# Patient Record
Sex: FEMALE | Race: WHITE | NOT HISPANIC OR LATINO | Employment: OTHER | URBAN - METROPOLITAN AREA
[De-identification: names, ages, dates, MRNs, and addresses within clinical notes are randomized per-mention and may not be internally consistent; named-entity substitution may affect disease eponyms.]

---

## 2017-03-14 ENCOUNTER — CLINICAL SUPPORT NO REQUIREMENTS (OUTPATIENT)
Dept: CARDIOLOGY | Facility: CLINIC | Age: 82
End: 2017-03-14

## 2017-03-14 DIAGNOSIS — I49.5 SINOATRIAL NODE DYSFUNCTION (HCC): Primary | ICD-10-CM

## 2017-03-14 PROCEDURE — 93296 REM INTERROG EVL PM/IDS: CPT | Performed by: INTERNAL MEDICINE

## 2017-03-14 PROCEDURE — 93294 REM INTERROG EVL PM/LDLS PM: CPT | Performed by: INTERNAL MEDICINE

## 2017-03-22 ENCOUNTER — OFFICE VISIT (OUTPATIENT)
Dept: FAMILY MEDICINE CLINIC | Facility: CLINIC | Age: 82
End: 2017-03-22

## 2017-03-22 VITALS
HEART RATE: 122 BPM | DIASTOLIC BLOOD PRESSURE: 78 MMHG | TEMPERATURE: 97.3 F | OXYGEN SATURATION: 83 % | BODY MASS INDEX: 27.65 KG/M2 | SYSTOLIC BLOOD PRESSURE: 118 MMHG | HEIGHT: 67 IN | WEIGHT: 176.2 LBS

## 2017-03-22 DIAGNOSIS — M48.061 LUMBAR CANAL STENOSIS: ICD-10-CM

## 2017-03-22 DIAGNOSIS — J43.1 PANLOBULAR EMPHYSEMA (HCC): Primary | ICD-10-CM

## 2017-03-22 PROCEDURE — 99213 OFFICE O/P EST LOW 20 MIN: CPT | Performed by: FAMILY MEDICINE

## 2017-03-22 RX ORDER — HYDROCODONE BITARTRATE AND ACETAMINOPHEN 5; 325 MG/1; MG/1
1 TABLET ORAL 2 TIMES DAILY PRN
Qty: 45 TABLET | Refills: 0 | Status: SHIPPED | OUTPATIENT
Start: 2017-03-22 | End: 2017-03-22 | Stop reason: SDUPTHER

## 2017-03-22 RX ORDER — PREDNISONE 10 MG/1
10 TABLET ORAL 2 TIMES DAILY
Qty: 14 TABLET | Refills: 0 | Status: SHIPPED | OUTPATIENT
Start: 2017-03-22 | End: 2018-03-05

## 2017-03-22 RX ORDER — ALBUTEROL SULFATE 2.5 MG/3ML
2.5 SOLUTION RESPIRATORY (INHALATION) EVERY 4 HOURS PRN
Qty: 30 VIAL | Refills: 5 | Status: SHIPPED | OUTPATIENT
Start: 2017-03-22 | End: 2019-01-01

## 2017-03-22 RX ORDER — ALBUTEROL SULFATE 90 UG/1
2 AEROSOL, METERED RESPIRATORY (INHALATION) EVERY 4 HOURS PRN
Qty: 1 INHALER | Refills: 11 | Status: SHIPPED | OUTPATIENT
Start: 2017-03-22

## 2017-03-22 RX ORDER — HYDROCODONE BITARTRATE AND ACETAMINOPHEN 5; 325 MG/1; MG/1
1 TABLET ORAL 2 TIMES DAILY PRN
Qty: 45 TABLET | Refills: 0 | Status: SHIPPED | OUTPATIENT
Start: 2017-03-22 | End: 2017-05-17 | Stop reason: SDUPTHER

## 2017-03-22 RX ORDER — AMOXICILLIN 500 MG/1
500 TABLET, FILM COATED ORAL 3 TIMES DAILY
Qty: 21 TABLET | Refills: 0 | Status: SHIPPED | OUTPATIENT
Start: 2017-03-22 | End: 2017-03-29

## 2017-03-22 NOTE — PROGRESS NOTES
"  Subjective   Salena Sanford is a 85 y.o. female who is here for   Chief Complaint   Patient presents with   • Cough   • Shortness of Breath   • Med Refill   • COPD   .     History of Present Illness   COPD: Patient complains of dyspnea, cough, wheezing, fatigue, chills, fever and increased sputum. Symptoms began several months ago. Symptoms chronic dyspnea, dyspnea after 1 blocks, dyspnea after 1 flights of stairs and inability to climb stairs does worsen with exertion. Sputum is green in copious amounts. Fever has been hot and cold spells. Patient uses 2 pillows at night. Patient can walk 10 feet before resting. Patient currently is not on home oxygen therapy.. Respiratory history: COPD, emphysema and pneumonia    Still smoking heavy  We again discussed she is smoking her self to death.  Explained a pneumonia may kill her.  \"I'm 86 yo, I'm ready to go.\"  Has changed her mind about home oxygen, wants to be tested.    The following portions of the patient's history were reviewed and updated as appropriate: allergies, current medications, past family history, past medical history, past social history, past surgical history and problem list.    Review of Systems    Objective   Physical Exam   Constitutional: She has a sickly appearance.   Cardiovascular: Normal rate.    Pulmonary/Chest: She has wheezes. She has rales.   Neurological: She is alert.   Psychiatric: Cognition and memory are impaired.   Nursing note and vitals reviewed.      Assessment/Plan   Salena was seen today for cough, shortness of breath, med refill and copd.    Diagnoses and all orders for this visit:    Panlobular emphysema  -     albuterol (PROAIR HFA) 108 (90 BASE) MCG/ACT inhaler; Inhale 2 puffs Every 4 (Four) Hours As Needed for Wheezing. Indications: Chronic Obstructive Lung Disease  -     albuterol (PROVENTIL) (2.5 MG/3ML) 0.083% nebulizer solution; Take 2.5 mg by nebulization Every 4 (Four) Hours As Needed for Wheezing for up to 30 doses. " Indications: Acute Bronchospasm  -     Overnight Sleep Oximetry Study; Future  -     amoxicillin (AMOXIL) 500 MG tablet; Take 1 tablet by mouth 3 (Three) Times a Day for 7 days.  -     predniSONE (DELTASONE) 10 MG tablet; Take 1 tablet by mouth 2 (Two) Times a Day.    Lumbar canal stenosis  -     Discontinue: HYDROcodone-acetaminophen (NORCO) 5-325 MG per tablet; Take 1 tablet by mouth 2 (Two) Times a Day As Needed for Severe Pain (7-10).  -     HYDROcodone-acetaminophen (NORCO) 5-325 MG per tablet; Take 1 tablet by mouth 2 (Two) Times a Day As Needed for Severe Pain (7-10).  -     Overnight Sleep Oximetry Study; Future      There are no Patient Instructions on file for this visit.    Medications Discontinued During This Encounter   Medication Reason   • predniSONE (DELTASONE) 10 MG tablet    • albuterol (PROAIR HFA) 108 (90 BASE) MCG/ACT inhaler Reorder   • albuterol (PROVENTIL) (2.5 MG/3ML) 0.083% nebulizer solution Reorder   • HYDROcodone-acetaminophen (NORCO) 5-325 MG per tablet Reorder   • HYDROcodone-acetaminophen (NORCO) 5-325 MG per tablet Reorder        No Follow-up on file.    Dr. Adarsh Khan  [place], [place]

## 2017-03-24 RX ORDER — VALSARTAN AND HYDROCHLOROTHIAZIDE 160; 12.5 MG/1; MG/1
1 TABLET, FILM COATED ORAL DAILY
Qty: 90 TABLET | Refills: 2 | Status: SHIPPED | OUTPATIENT
Start: 2017-03-24 | End: 2017-03-27 | Stop reason: SDUPTHER

## 2017-03-27 RX ORDER — VALSARTAN AND HYDROCHLOROTHIAZIDE 160; 12.5 MG/1; MG/1
1 TABLET, FILM COATED ORAL DAILY
Qty: 90 TABLET | Refills: 2 | Status: SHIPPED | OUTPATIENT
Start: 2017-03-27 | End: 2018-03-05 | Stop reason: SDUPTHER

## 2017-04-19 ENCOUNTER — OFFICE VISIT (OUTPATIENT)
Dept: FAMILY MEDICINE CLINIC | Facility: CLINIC | Age: 82
End: 2017-04-19

## 2017-04-19 VITALS
OXYGEN SATURATION: 90 % | HEART RATE: 54 BPM | BODY MASS INDEX: 27.29 KG/M2 | DIASTOLIC BLOOD PRESSURE: 80 MMHG | SYSTOLIC BLOOD PRESSURE: 160 MMHG | TEMPERATURE: 97.2 F | HEIGHT: 67 IN | WEIGHT: 173.9 LBS

## 2017-04-19 DIAGNOSIS — J43.1 PANLOBULAR EMPHYSEMA (HCC): Primary | ICD-10-CM

## 2017-04-19 DIAGNOSIS — Z99.81 OXYGEN DEPENDENT: ICD-10-CM

## 2017-04-19 PROBLEM — Z66 DNR (DO NOT RESUSCITATE): Status: ACTIVE | Noted: 2017-04-19

## 2017-04-19 PROCEDURE — 99213 OFFICE O/P EST LOW 20 MIN: CPT | Performed by: FAMILY MEDICINE

## 2017-04-19 RX ORDER — AMOXICILLIN 500 MG/1
500 TABLET, FILM COATED ORAL 3 TIMES DAILY
Qty: 21 TABLET | Refills: 0 | Status: SHIPPED | OUTPATIENT
Start: 2017-04-19 | End: 2017-04-26

## 2017-04-19 RX ORDER — IPRATROPIUM BROMIDE AND ALBUTEROL SULFATE 2.5; .5 MG/3ML; MG/3ML
3 SOLUTION RESPIRATORY (INHALATION) EVERY 4 HOURS PRN
Qty: 120 VIAL | Refills: 11 | Status: SHIPPED | OUTPATIENT
Start: 2017-04-19 | End: 2018-03-05 | Stop reason: SDUPTHER

## 2017-04-19 NOTE — PROGRESS NOTES
Subjective   Salena Sanford is a 85 y.o. female who is here for   Chief Complaint   Patient presents with   • COPD   • Follow-up   .     History of Present Illness   COPD: Patient complains of dyspnea, cough, wheezing, fatigue, increased sputum and colored sputum. Symptoms began several years ago. Symptoms chronic dyspnea, dyspnea after 1 blocks, dyspnea after 1 flights of stairs and inability to climb stairs does worsen with exertion. Sputum is yellow in copious amounts. Fever has been hot and cold spells. Patient uses 4 pillows at night. Patient can walk 10 feet before resting. Patient currently is on oxygen at 2 L/min per nasal cannula.. Respiratory history: emphysema     Had + overnight pulse ox  Spent most of night < 90 %  Goes tomorrow for her 6 minute walk.        The following portions of the patient's history were reviewed and updated as appropriate: allergies, current medications, past medical history, past social history and problem list.    Review of Systems    Objective   Physical Exam   Constitutional: She appears well-developed and well-nourished. No distress.   HENT:   Mouth/Throat: Oropharynx is clear and moist.   Eyes: Conjunctivae are normal.   Cardiovascular: Normal rate.    Pulmonary/Chest: She has wheezes. She has rales.   Neurological: She is alert.   Psychiatric: She exhibits a depressed mood.   Nursing note and vitals reviewed.      Assessment/Plan   Salena was seen today for copd and follow-up.    Diagnoses and all orders for this visit:    Panlobular emphysema  -     ipratropium-albuterol (DUO-NEB) 0.5-2.5 mg/mL nebulizer; Take 3 mL by nebulization Every 4 (Four) Hours As Needed for Wheezing. Indications: Spasm of Lung Air Passages  -     amoxicillin (AMOXIL) 500 MG tablet; Take 1 tablet by mouth 3 (Three) Times a Day for 7 days.    Oxygen dependent  -     ipratropium-albuterol (DUO-NEB) 0.5-2.5 mg/mL nebulizer; Take 3 mL by nebulization Every 4 (Four) Hours As Needed for Wheezing.  Indications: Spasm of Lung Air Passages      There are no Patient Instructions on file for this visit.    There are no discontinued medications.     Return in about 1 month (around 5/19/2017).    Dr. Adarsh Khan  [place], [place]

## 2017-05-17 ENCOUNTER — OFFICE VISIT (OUTPATIENT)
Dept: FAMILY MEDICINE CLINIC | Facility: CLINIC | Age: 82
End: 2017-05-17

## 2017-05-17 VITALS
DIASTOLIC BLOOD PRESSURE: 70 MMHG | HEIGHT: 67 IN | WEIGHT: 173 LBS | OXYGEN SATURATION: 92 % | HEART RATE: 81 BPM | SYSTOLIC BLOOD PRESSURE: 120 MMHG | BODY MASS INDEX: 27.15 KG/M2 | RESPIRATION RATE: 18 BRPM

## 2017-05-17 DIAGNOSIS — J43.1 PANLOBULAR EMPHYSEMA (HCC): Primary | ICD-10-CM

## 2017-05-17 DIAGNOSIS — M48.061 LUMBAR CANAL STENOSIS: ICD-10-CM

## 2017-05-17 DIAGNOSIS — I10 ESSENTIAL HYPERTENSION: ICD-10-CM

## 2017-05-17 PROCEDURE — 99213 OFFICE O/P EST LOW 20 MIN: CPT | Performed by: FAMILY MEDICINE

## 2017-05-17 RX ORDER — AMOXICILLIN 500 MG/1
500 TABLET, FILM COATED ORAL 3 TIMES DAILY
Qty: 21 TABLET | Refills: 0 | Status: SHIPPED | OUTPATIENT
Start: 2017-05-17 | End: 2017-05-24

## 2017-05-17 RX ORDER — HYDROCODONE BITARTRATE AND ACETAMINOPHEN 5; 325 MG/1; MG/1
1 TABLET ORAL 2 TIMES DAILY PRN
Qty: 45 TABLET | Refills: 0 | Status: SHIPPED | OUTPATIENT
Start: 2017-05-17 | End: 2017-08-22 | Stop reason: SDUPTHER

## 2017-05-17 RX ORDER — HYDROCODONE BITARTRATE AND ACETAMINOPHEN 5; 325 MG/1; MG/1
1 TABLET ORAL 2 TIMES DAILY PRN
Qty: 45 TABLET | Refills: 0 | Status: SHIPPED | OUTPATIENT
Start: 2017-05-17 | End: 2017-05-17 | Stop reason: SDUPTHER

## 2017-08-22 ENCOUNTER — OFFICE VISIT (OUTPATIENT)
Dept: FAMILY MEDICINE CLINIC | Facility: CLINIC | Age: 82
End: 2017-08-22

## 2017-08-22 VITALS
TEMPERATURE: 97.6 F | HEART RATE: 64 BPM | OXYGEN SATURATION: 97 % | DIASTOLIC BLOOD PRESSURE: 68 MMHG | BODY MASS INDEX: 27.7 KG/M2 | SYSTOLIC BLOOD PRESSURE: 120 MMHG | HEIGHT: 67 IN | WEIGHT: 176.5 LBS

## 2017-08-22 DIAGNOSIS — M48.061 LUMBAR CANAL STENOSIS: ICD-10-CM

## 2017-08-22 DIAGNOSIS — J43.1 PANLOBULAR EMPHYSEMA (HCC): Primary | ICD-10-CM

## 2017-08-22 DIAGNOSIS — I10 ESSENTIAL HYPERTENSION: ICD-10-CM

## 2017-08-22 PROCEDURE — 99213 OFFICE O/P EST LOW 20 MIN: CPT | Performed by: FAMILY MEDICINE

## 2017-08-22 RX ORDER — HYDROCODONE BITARTRATE AND ACETAMINOPHEN 5; 325 MG/1; MG/1
1 TABLET ORAL 2 TIMES DAILY PRN
Qty: 45 TABLET | Refills: 0 | Status: SHIPPED | OUTPATIENT
Start: 2017-08-22 | End: 2018-03-05 | Stop reason: SDUPTHER

## 2017-08-22 RX ORDER — CELECOXIB 200 MG/1
200 CAPSULE ORAL DAILY
Qty: 90 CAPSULE | Refills: 3 | Status: SHIPPED | OUTPATIENT
Start: 2017-08-22 | End: 2018-08-22 | Stop reason: SDUPTHER

## 2017-08-22 NOTE — PROGRESS NOTES
"  Chief Complaint   Patient presents with   • Follow-up   • Hypertension   • Emphysema       Subjective     Patient here for follow-up of elevated blood pressure.    She is not exercising and is not adherent to a low-salt diet.    Blood pressure is well controlled at home.   Cardiac symptoms: dyspnea, fatigue, lower extremity edema and tachypnea.   Patient denies: chest pressure/discomfort and near-syncope.   Cardiovascular risk factors: advanced age (older than 55 for men, 65 for women), dyslipidemia, family history of premature cardiovascular disease, hypertension and smoking/ tobacco exposure.   Use of agents associated with hypertension: none.   History of target organ damage: none.  Patient is taking prescribed hypertension medications as prescribed without side effects.  Back pain is the same  Overall OA was getting worse, but she stopped her Celebrex, her family has convinced her to re start Celebrex.  On her oxygen 24h  Her feet and toes are as blue as ever.  Still smoking a ppd, with no intentions of every quitting    The following portions of the patient's history were reviewed and updated as appropriate: allergies, current medications, past medical history, past social history, past surgical history and problem list.    Review of Systems  A comprehensive review of systems was negative except for: Respiratory: positive for chronic bronchitis, cough, dyspnea on exertion, emphysema, sputum and wheezing    No results found for this or any previous visit.     Vitals:    08/22/17 1249   BP: 120/68   BP Location: Left arm   Patient Position: Sitting   Pulse: 64   Temp: 97.6 °F (36.4 °C)   SpO2: 97%   Weight: 176 lb 8 oz (80.1 kg)   Height: 67\" (170.2 cm)     Objective    Gen: alert, pleasant.  HEENT: PERRL, EOMI intact, lids ok, ear canals clear, TMs normal, throat clear, nostrils normal  Neck: no bruit, no enlarged thyroid  Lungs: wheeze  Heart: RR no murmur  ABD: soft , + BS  Pulses:feet blue  Mood: stable   "   Assessment/Plan   Hypertension, normal blood pressure Evidence of target organ damage: none.    Salena was seen today for follow-up, hypertension and emphysema.    Diagnoses and all orders for this visit:    Panlobular emphysema    Essential hypertension    Lumbar canal stenosis  -     celecoxib (CeleBREX) 200 MG capsule; Take 1 capsule by mouth Daily.  -     HYDROcodone-acetaminophen (NORCO) 5-325 MG per tablet; Take 1 tablet by mouth 2 (Two) Times a Day As Needed for Severe Pain .      Medication: no change.  Follow up: 6 months and as needed.    There are no Patient Instructions on file for this visit.  Medications Discontinued During This Encounter   Medication Reason   • celecoxib (CeleBREX) 200 MG capsule Reorder   • HYDROcodone-acetaminophen (NORCO) 5-325 MG per tablet Reorder        No Follow-up on file.    Limit salt  Limit alcoholic drinks to 1 a day  Limit caffeine to 1-2 servings a day    Dr. Adarsh Khan MD  [place], [place]  [facility].

## 2017-08-31 ENCOUNTER — TELEPHONE (OUTPATIENT)
Dept: FAMILY MEDICINE CLINIC | Facility: CLINIC | Age: 82
End: 2017-08-31

## 2017-09-15 ENCOUNTER — TELEPHONE (OUTPATIENT)
Dept: FAMILY MEDICINE CLINIC | Facility: CLINIC | Age: 82
End: 2017-09-15

## 2017-09-15 RX ORDER — CEFDINIR 300 MG/1
300 CAPSULE ORAL 2 TIMES DAILY
Qty: 20 CAPSULE | Refills: 0 | Status: SHIPPED | OUTPATIENT
Start: 2017-09-15 | End: 2018-03-05

## 2017-09-15 NOTE — TELEPHONE ENCOUNTER
Pts daughter called and said that pt is coughing up think green stuff and is SOA. She is thinking she really needs an antibiotic. She would like us to send her in one to pharmacy.

## 2017-10-21 ENCOUNTER — CLINICAL SUPPORT NO REQUIREMENTS (OUTPATIENT)
Dept: CARDIOLOGY | Facility: CLINIC | Age: 82
End: 2017-10-21

## 2017-10-21 DIAGNOSIS — I49.5 SICK SINUS SYNDROME (HCC): Primary | ICD-10-CM

## 2017-10-21 PROCEDURE — 93296 REM INTERROG EVL PM/IDS: CPT | Performed by: INTERNAL MEDICINE

## 2017-10-21 PROCEDURE — 93294 REM INTERROG EVL PM/LDLS PM: CPT | Performed by: INTERNAL MEDICINE

## 2018-03-05 ENCOUNTER — OFFICE VISIT (OUTPATIENT)
Dept: FAMILY MEDICINE CLINIC | Facility: CLINIC | Age: 83
End: 2018-03-05

## 2018-03-05 VITALS
SYSTOLIC BLOOD PRESSURE: 138 MMHG | BODY MASS INDEX: 28.17 KG/M2 | DIASTOLIC BLOOD PRESSURE: 70 MMHG | WEIGHT: 179.5 LBS | HEIGHT: 67 IN | TEMPERATURE: 97.4 F | OXYGEN SATURATION: 92 % | HEART RATE: 76 BPM

## 2018-03-05 DIAGNOSIS — I10 ESSENTIAL HYPERTENSION: ICD-10-CM

## 2018-03-05 DIAGNOSIS — Z99.81 OXYGEN DEPENDENT: ICD-10-CM

## 2018-03-05 DIAGNOSIS — J44.1 CHRONIC OBSTRUCTIVE PULMONARY DISEASE WITH ACUTE EXACERBATION (HCC): Primary | ICD-10-CM

## 2018-03-05 DIAGNOSIS — J43.1 PANLOBULAR EMPHYSEMA (HCC): ICD-10-CM

## 2018-03-05 DIAGNOSIS — M48.061 SPINAL STENOSIS OF LUMBAR REGION WITHOUT NEUROGENIC CLAUDICATION: ICD-10-CM

## 2018-03-05 PROCEDURE — 99213 OFFICE O/P EST LOW 20 MIN: CPT | Performed by: FAMILY MEDICINE

## 2018-03-05 RX ORDER — HYDROCODONE BITARTRATE AND ACETAMINOPHEN 5; 325 MG/1; MG/1
1 TABLET ORAL
Qty: 30 TABLET | Refills: 0 | Status: SHIPPED | OUTPATIENT
Start: 2018-03-05 | End: 2019-01-01

## 2018-03-05 RX ORDER — VALSARTAN AND HYDROCHLOROTHIAZIDE 160; 12.5 MG/1; MG/1
1 TABLET, FILM COATED ORAL DAILY
Qty: 90 TABLET | Refills: 3 | Status: SHIPPED | OUTPATIENT
Start: 2018-03-05 | End: 2019-01-01 | Stop reason: SDUPTHER

## 2018-03-05 RX ORDER — HYDROCODONE BITARTRATE AND ACETAMINOPHEN 5; 325 MG/1; MG/1
1 TABLET ORAL
Qty: 30 TABLET | Refills: 0 | Status: SHIPPED | OUTPATIENT
Start: 2018-03-05 | End: 2018-03-05 | Stop reason: SDUPTHER

## 2018-03-05 RX ORDER — GUAIFENESIN 600 MG/1
1200 TABLET, EXTENDED RELEASE ORAL 2 TIMES DAILY
COMMUNITY

## 2018-03-05 RX ORDER — IPRATROPIUM BROMIDE AND ALBUTEROL SULFATE 2.5; .5 MG/3ML; MG/3ML
3 SOLUTION RESPIRATORY (INHALATION) EVERY 4 HOURS PRN
Qty: 120 VIAL | Refills: 5 | Status: SHIPPED | OUTPATIENT
Start: 2018-03-05 | End: 2019-01-01 | Stop reason: SDUPTHER

## 2018-03-05 NOTE — PROGRESS NOTES
Subjective   Salena Sanford is a 86 y.o. female who is here for   Chief Complaint   Patient presents with   • Follow-up   • Hypertension   • Emphysema   .     History of Present Illness   COPD: Patient complains of dyspnea, cough, wheezing and fatigue. Symptoms began 10 years ago. Symptoms chronic dyspnea, dyspnea after 1 blocks, dyspnea after 1 flights of stairs, inability to climb stairs, cough productive of green sputum in copious amounts and wheezing does worsen with exertion. Sputum is green in copious amounts. Fever has been hot and cold spells. Patient uses 4 pillows at night. Patient can walk 20 feet before resting. Patient currently is on oxygen at 2 L/min per nasal cannula.. Respiratory history: frequent episodes of bronchitis, COPD, emphysema and pneumonia     Still smoking, no plans on quitting    Wants no tests/labs/preventive care/ nothing.  Strong willed  Ok with death.     The following portions of the patient's history were reviewed and updated as appropriate: allergies, current medications, past medical history, past social history, past surgical history and problem list.    Review of Systems    Objective   Physical Exam   Constitutional: She appears well-developed and well-nourished.   Cardiovascular: Normal rate.    Pulmonary/Chest: She has wheezes. She has rales.   Neurological: She is alert.   Nursing note and vitals reviewed.      Assessment/Plan   Salena was seen today for follow-up, hypertension and emphysema.    Diagnoses and all orders for this visit:    Chronic obstructive pulmonary disease with acute exacerbation    Panlobular emphysema  -     ipratropium-albuterol (DUO-NEB) 0.5-2.5 mg/mL nebulizer; Take 3 mL by nebulization Every 4 (Four) Hours As Needed for Wheezing. Indications: Spasm of Lung Air Passages    Oxygen dependent  -     ipratropium-albuterol (DUO-NEB) 0.5-2.5 mg/mL nebulizer; Take 3 mL by nebulization Every 4 (Four) Hours As Needed for Wheezing. Indications: Spasm of  Lung Air Passages    Spinal stenosis of lumbar region without neurogenic claudication  -     Discontinue: HYDROcodone-acetaminophen (NORCO) 5-325 MG per tablet; Take 1 tablet by mouth Daily With Breakfast.  -     HYDROcodone-acetaminophen (NORCO) 5-325 MG per tablet; Take 1 tablet by mouth Daily With Breakfast.    Essential hypertension  -     valsartan-hydrochlorothiazide (DIOVAN-HCT) 160-12.5 MG per tablet; Take 1 tablet by mouth Daily. Indications: High Blood Pressure Disorder      There are no Patient Instructions on file for this visit.    Medications Discontinued During This Encounter   Medication Reason   • cefdinir (OMNICEF) 300 MG capsule *Therapy completed   • predniSONE (DELTASONE) 10 MG tablet *Therapy completed   • valsartan-hydrochlorothiazide (DIOVAN-HCT) 160-12.5 MG per tablet Reorder   • ipratropium-albuterol (DUO-NEB) 0.5-2.5 mg/mL nebulizer Reorder   • HYDROcodone-acetaminophen (NORCO) 5-325 MG per tablet Reorder   • HYDROcodone-acetaminophen (NORCO) 5-325 MG per tablet Reorder        Return in about 6 months (around 9/5/2018).    Dr. Adarsh Khan  [facility]  [place], [place]

## 2018-03-05 NOTE — PROGRESS NOTES
"  Chief Complaint   Patient presents with   • Follow-up   • Hypertension   • Emphysema       Subjective     Patient here for follow-up of elevated blood pressure.    She {is/is not:9024} exercising and {is/is not:9024} adherent to a low-salt diet.    Blood pressure {is/is not:9024} well controlled at home.   Cardiac symptoms: {symptoms:14337}.   Patient denies: {symptoms; cardiac:63315}.   Cardiovascular risk factors: {risk factors:510}.   Use of agents associated with hypertension: {meds:511::\"none\"}.   History of target organ damage: {diagnoses:1135860088}.  Patient is taking prescribed hypertension medications as prescribed without side effects.    {Common ambulatory SmartLinks:34311}    Review of Systems  {ros; complete:13575}    No results found for this or any previous visit.     Vitals:    03/05/18 1239   BP: 138/70   BP Location: Left arm   Patient Position: Sitting   Pulse: 76   Temp: 97.4 °F (36.3 °C)   SpO2: 92%   Weight: 81.4 kg (179 lb 8 oz)   Height: 170.2 cm (67\")     Objective    Gen: alert, pleasant.  HEENT: PERRL, EOMI intact, lids ok, ear canals clear, TMs normal, throat clear, nostrils normal  Neck: no bruit, no enlarged thyroid  Lungs: CTA  Heart: RR no murmur  ABD: soft , + BS  Pulses: intact  Mood: stable     Assessment/Plan   Hypertension, {control:42706} Evidence of target organ damage: {target organ:5287569034}.    {Assess/PlanSmartLinks:07590}  {Plan:5827110699}    There are no Patient Instructions on file for this visit.  Medications Discontinued During This Encounter   Medication Reason   • cefdinir (OMNICEF) 300 MG capsule *Therapy completed   • predniSONE (DELTASONE) 10 MG tablet *Therapy completed        No Follow-up on file.    Limit salt  Limit alcoholic drinks to 1 a day  Limit caffeine to 1-2 servings a day    Dr. Adarsh Khan MD  [place], [place]  [facility].  "

## 2018-08-22 DIAGNOSIS — M48.061 LUMBAR CANAL STENOSIS: ICD-10-CM

## 2018-08-22 RX ORDER — CELECOXIB 200 MG/1
CAPSULE ORAL
Qty: 90 CAPSULE | Refills: 0 | Status: SHIPPED | OUTPATIENT
Start: 2018-08-22 | End: 2019-01-05 | Stop reason: SDUPTHER

## 2019-01-01 ENCOUNTER — TELEPHONE (OUTPATIENT)
Dept: FAMILY MEDICINE CLINIC | Facility: CLINIC | Age: 84
End: 2019-01-01

## 2019-01-01 ENCOUNTER — OFFICE VISIT (OUTPATIENT)
Dept: FAMILY MEDICINE CLINIC | Facility: CLINIC | Age: 84
End: 2019-01-01

## 2019-01-01 ENCOUNTER — TELEPHONE (OUTPATIENT)
Dept: CASE MANAGEMENT | Facility: OTHER | Age: 84
End: 2019-01-01

## 2019-01-01 VITALS
WEIGHT: 186 LBS | SYSTOLIC BLOOD PRESSURE: 128 MMHG | BODY MASS INDEX: 29.19 KG/M2 | HEART RATE: 62 BPM | DIASTOLIC BLOOD PRESSURE: 88 MMHG | HEIGHT: 67 IN | OXYGEN SATURATION: 95 %

## 2019-01-01 VITALS
HEIGHT: 67 IN | DIASTOLIC BLOOD PRESSURE: 78 MMHG | HEART RATE: 73 BPM | WEIGHT: 182 LBS | SYSTOLIC BLOOD PRESSURE: 134 MMHG | OXYGEN SATURATION: 86 % | BODY MASS INDEX: 28.56 KG/M2

## 2019-01-01 DIAGNOSIS — M48.061 SPINAL STENOSIS OF LUMBAR REGION WITHOUT NEUROGENIC CLAUDICATION: ICD-10-CM

## 2019-01-01 DIAGNOSIS — J43.1 PANLOBULAR EMPHYSEMA (HCC): Primary | ICD-10-CM

## 2019-01-01 DIAGNOSIS — M48.061 LUMBAR CANAL STENOSIS: ICD-10-CM

## 2019-01-01 DIAGNOSIS — I10 ESSENTIAL HYPERTENSION: ICD-10-CM

## 2019-01-01 DIAGNOSIS — Z99.81 OXYGEN DEPENDENT: ICD-10-CM

## 2019-01-01 DIAGNOSIS — Z95.0 HISTORY OF PERMANENT CARDIAC PACEMAKER PLACEMENT: Primary | ICD-10-CM

## 2019-01-01 DIAGNOSIS — J43.1 PANLOBULAR EMPHYSEMA (HCC): ICD-10-CM

## 2019-01-01 DIAGNOSIS — Z66 DNR (DO NOT RESUSCITATE): ICD-10-CM

## 2019-01-01 DIAGNOSIS — J44.1 CHRONIC OBSTRUCTIVE PULMONARY DISEASE WITH ACUTE EXACERBATION (HCC): Primary | ICD-10-CM

## 2019-01-01 PROCEDURE — 99213 OFFICE O/P EST LOW 20 MIN: CPT | Performed by: FAMILY MEDICINE

## 2019-01-01 RX ORDER — VALSARTAN AND HYDROCHLOROTHIAZIDE 160; 12.5 MG/1; MG/1
TABLET, FILM COATED ORAL
Qty: 90 TABLET | Refills: 2 | Status: SHIPPED | OUTPATIENT
Start: 2019-01-01

## 2019-01-01 RX ORDER — NAPROXEN SODIUM 220 MG
220 TABLET ORAL 2 TIMES DAILY PRN
COMMUNITY

## 2019-01-01 RX ORDER — CELECOXIB 200 MG/1
200 CAPSULE ORAL DAILY
Qty: 90 CAPSULE | Refills: 3 | Status: SHIPPED | OUTPATIENT
Start: 2019-01-01

## 2019-01-01 RX ORDER — CELECOXIB 200 MG/1
CAPSULE ORAL
Qty: 30 CAPSULE | Refills: 0 | Status: SHIPPED | OUTPATIENT
Start: 2019-01-01 | End: 2019-01-01 | Stop reason: SDUPTHER

## 2019-01-01 RX ORDER — NITROGLYCERIN 0.4 MG/1
0.4 TABLET SUBLINGUAL
Qty: 30 TABLET | Refills: 0 | Status: SHIPPED | OUTPATIENT
Start: 2019-01-01

## 2019-01-01 RX ORDER — HYDROCODONE BITARTRATE AND ACETAMINOPHEN 5; 325 MG/1; MG/1
1 TABLET ORAL
Qty: 30 TABLET | Refills: 0 | Status: SHIPPED | OUTPATIENT
Start: 2019-01-01 | End: 2019-01-01 | Stop reason: SDUPTHER

## 2019-01-01 RX ORDER — CEPHALEXIN 500 MG/1
500 CAPSULE ORAL 3 TIMES DAILY
Qty: 21 CAPSULE | Refills: 0 | Status: SHIPPED | OUTPATIENT
Start: 2019-01-01 | End: 2019-01-01

## 2019-01-01 RX ORDER — HYDROCODONE BITARTRATE AND ACETAMINOPHEN 5; 325 MG/1; MG/1
1 TABLET ORAL
Qty: 30 TABLET | Refills: 0 | Status: SHIPPED | OUTPATIENT
Start: 2019-01-01

## 2019-01-01 RX ORDER — IPRATROPIUM BROMIDE AND ALBUTEROL SULFATE 2.5; .5 MG/3ML; MG/3ML
3 SOLUTION RESPIRATORY (INHALATION) EVERY 4 HOURS PRN
Qty: 120 VIAL | Refills: 5 | Status: SHIPPED | OUTPATIENT
Start: 2019-01-01

## 2019-01-05 DIAGNOSIS — M48.061 LUMBAR CANAL STENOSIS: ICD-10-CM

## 2019-01-07 RX ORDER — CELECOXIB 200 MG/1
CAPSULE ORAL
Qty: 30 CAPSULE | Refills: 0 | Status: SHIPPED | OUTPATIENT
Start: 2019-01-07 | End: 2019-01-01 | Stop reason: SDUPTHER

## 2019-01-22 NOTE — PROGRESS NOTES
"  Subjective   Salena Sanford is a 87 y.o. female who is here for   Chief Complaint   Patient presents with   • Follow-up   • Hypertension   • COPD   .     History of Present Illness   COPD: Patient complains of dyspnea, cough, wheezing and fatigue. Symptoms began 10 years ago. Symptoms chronic dyspnea and cough productive of yellow sputum in large amounts does worsen with exertion. Sputum is green in large amounts. Fever has been hot and cold spells. Patient uses 4 pillows at night. Patient can walk 5 feet before resting. Patient currently is on oxygen at 2 L/min per nasal cannula.. Respiratory history: COPD   I smoke during the day and wear my oxygen at night\"      The following portions of the patient's history were reviewed and updated as appropriate: allergies, current medications, past medical history, past social history, past surgical history and problem list.    Review of Systems    Objective   Physical Exam   Constitutional: She has a sickly appearance. No distress.   Cardiovascular: Normal rate.   Pulmonary/Chest: She has wheezes. She has rales.   Musculoskeletal: She exhibits tenderness and deformity.   Nursing note and vitals reviewed.      Assessment/Plan   Salena was seen today for follow-up, hypertension and copd.    Diagnoses and all orders for this visit:    Spinal stenosis of lumbar region without neurogenic claudication  -     HYDROcodone-acetaminophen (NORCO) 5-325 MG per tablet; Take 1 tablet by mouth Daily With Breakfast.    Lumbar canal stenosis  -     celecoxib (CeleBREX) 200 MG capsule; Take 1 capsule by mouth Daily.      There are no Patient Instructions on file for this visit.    Medications Discontinued During This Encounter   Medication Reason   • HYDROcodone-acetaminophen (NORCO) 5-325 MG per tablet *Therapy completed   • albuterol (PROVENTIL) (2.5 MG/3ML) 0.083% nebulizer solution *Therapy completed   • celecoxib (CeleBREX) 200 MG capsule Reorder        Return in about 6 months " (around 7/22/2019).    Dr. Adarsh Khan  [facility] [place], [place]

## 2019-01-22 NOTE — PROGRESS NOTES
"  Chief Complaint   Patient presents with   • Follow-up   • Hypertension   • COPD       Subjective     Patient here for follow-up of elevated blood pressure.    She {is/is not:9024} exercising and {is/is not:9024} adherent to a low-salt diet.    Blood pressure {is/is not:9024} well controlled at home.   Cardiac symptoms: {symptoms:66850}.   Patient denies: {symptoms; cardiac:77205}.   Cardiovascular risk factors: {risk factors:510}.   Use of agents associated with hypertension: {meds:511::\"none\"}.   History of target organ damage: {diagnoses:9651370972}.  Patient is taking prescribed hypertension medications as prescribed without side effects.    {Common ambulatory SmartLinks:14361}    Review of Systems  {ros; complete:45754}    No results found for this or any previous visit.     Vitals:    01/22/19 1438   BP: 134/78   BP Location: Left arm   Patient Position: Sitting   Pulse: 73   SpO2: (!) 86%   Weight: 82.6 kg (182 lb)   Height: 170.2 cm (67\")     BP Readings from Last 3 Encounters:   01/22/19 134/78   03/05/18 138/70   08/22/17 120/68     Objective      Gen: alert, pleasant.  HEENT: PERRL, EOMI intact, lids ok, ear canals clear, TMs normal, throat clear, nostrils normal  Neck: no bruit, no enlarged thyroid  Lungs: CTA  Heart: RR no murmur  ABD: soft , + BS  Pulses: intact  Mood: stable     Assessment/Plan   Hypertension, {control:41073} Evidence of target organ damage: {target organ:2201337050}.    {Assess/PlanSmartLinks:25820}  {Plan:8257538797}    There are no Patient Instructions on file for this visit.  Medications Discontinued During This Encounter   Medication Reason   • HYDROcodone-acetaminophen (NORCO) 5-325 MG per tablet *Therapy completed   • albuterol (PROVENTIL) (2.5 MG/3ML) 0.083% nebulizer solution *Therapy completed   • celecoxib (CeleBREX) 200 MG capsule Reorder        No Follow-up on file.    Limit salt  Limit alcoholic drinks to 1 a day  Limit caffeine to 1-2 servings a day    Dr. Altamirano " Erin AIKEN  [place], [place]  [facility].

## 2019-04-24 NOTE — TELEPHONE ENCOUNTER
Asking for a refill on her mothers norco    Ok I printed 2 and gave to her daughter Amanda who is in the office today    Adarsh Khan MD

## 2019-07-31 NOTE — PROGRESS NOTES
Subjective   Salena Sanford is a 87 y.o. female who is here for   Chief Complaint   Patient presents with   • COPD   .     History of Present Illness   COPD: Patient complains of dyspnea, cough, wheezing and fatigue. Symptoms began 30 years ago. Symptoms chronic dyspnea, dyspnea after 1 blocks, dyspnea after 1 flights of stairs, inability to climb stairs and cough productive of green sputum in moderate amounts does worsen with exertion. Sputum is green in moderate amounts. Fever has been hot and cold spells. Patient uses 4 pillows at night. Patient can walk 20 feet before resting. Patient currently is on oxygen at 2 L/min per nasal cannula.. Respiratory history: COPD and emphysema        The following portions of the patient's history were reviewed and updated as appropriate: allergies, current medications, past medical history, past social history, past surgical history and problem list.    Review of Systems    Objective   Physical Exam   Constitutional: She has a sickly appearance.   Cardiovascular: Normal rate.   Pulmonary/Chest: She has wheezes. She has rales.   Musculoskeletal: She exhibits edema.   Neurological: She is alert.   Nursing note and vitals reviewed.  feet swollen and dark red and toes are blue, as usual    Assessment/Plan   Salena was seen today for copd.    Diagnoses and all orders for this visit:    Panlobular emphysema (CMS/HCC)  -     ipratropium-albuterol (DUO-NEB) 0.5-2.5 mg/3 ml nebulizer; Take 3 mL by nebulization Every 4 (Four) Hours As Needed for Wheezing.    Essential hypertension    Oxygen dependent  -     ipratropium-albuterol (DUO-NEB) 0.5-2.5 mg/3 ml nebulizer; Take 3 mL by nebulization Every 4 (Four) Hours As Needed for Wheezing.    Spinal stenosis of lumbar region without neurogenic claudication  -     HYDROcodone-acetaminophen (NORCO) 5-325 MG per tablet; Take 1 tablet by mouth Daily With Breakfast.    outlook is poor    There are no Patient Instructions on file for this  visit.    Medications Discontinued During This Encounter   Medication Reason   • ipratropium-albuterol (DUO-NEB) 0.5-2.5 mg/mL nebulizer Reorder   • HYDROcodone-acetaminophen (NORCO) 5-325 MG per tablet Reorder        Return in about 6 months (around 1/31/2020).    Dr. Adarsh Khan  [place], [place]

## 2019-08-12 NOTE — TELEPHONE ENCOUNTER
Patients daughter called, patient felt like her heart stopped, then pace maker kicked back on. She does not want to go anywhere or see anyone. Pt was given aspirin and oxygen, states she feels better. Daughter would like to know if you can send in a new refill of Nitro. Her's has been  since . Would like to to go to [address].     Jose  SL nitro sent in    Adarsh Khan MD

## 2019-09-27 NOTE — TELEPHONE ENCOUNTER
Pt daughter called stating everyone around her has URI symptoms and now pt does too. She has been using mucinex but its not helping, can something be called in?    kelfex antibiotics sent in to pharmacy    Adarsh Khan MD

## 2019-10-29 NOTE — TELEPHONE ENCOUNTER
RN-CCC attempted to contact patient to schedule the Medicare AWV prior to end of year; left voicemail for patient to return my call.

## 2019-12-27 NOTE — TELEPHONE ENCOUNTER
Uma with [facility] called asking if you can send a order for them to see the patient to evaluate her per daughters request. Please advise.     Agree  End stage copd    Adarsh Khan MD